# Patient Record
Sex: MALE | NOT HISPANIC OR LATINO
[De-identification: names, ages, dates, MRNs, and addresses within clinical notes are randomized per-mention and may not be internally consistent; named-entity substitution may affect disease eponyms.]

---

## 2018-11-12 PROBLEM — Z87.891 FORMER SMOKER: Status: ACTIVE | Noted: 2018-11-12

## 2018-11-14 ENCOUNTER — NON-APPOINTMENT (OUTPATIENT)
Age: 43
End: 2018-11-14

## 2018-11-14 ENCOUNTER — APPOINTMENT (OUTPATIENT)
Dept: HEART AND VASCULAR | Facility: CLINIC | Age: 43
End: 2018-11-14
Payer: COMMERCIAL

## 2018-11-14 VITALS
BODY MASS INDEX: 24.05 KG/M2 | HEIGHT: 70 IN | DIASTOLIC BLOOD PRESSURE: 70 MMHG | OXYGEN SATURATION: 99 % | HEART RATE: 63 BPM | SYSTOLIC BLOOD PRESSURE: 116 MMHG | WEIGHT: 168 LBS

## 2018-11-14 DIAGNOSIS — Z87.891 PERSONAL HISTORY OF NICOTINE DEPENDENCE: ICD-10-CM

## 2018-11-14 PROCEDURE — 99214 OFFICE O/P EST MOD 30 MIN: CPT | Mod: 25

## 2018-11-14 PROCEDURE — 93000 ELECTROCARDIOGRAM COMPLETE: CPT

## 2018-11-14 NOTE — HISTORY OF PRESENT ILLNESS
[FreeTextEntry1] : Mr. Sandhu presents for evaluation and management of a bicuspid aortic valve, dilated aortic root, and coarctation of the aorta. His bicuspid aortic valve was noted after a physician auscultated a murmur and he went on to have an echocardiogram. He was last seen on 11/03/16. He had an echocardiogram on 10/18/16 which revealed an EF of 62%, bicuspid aortic valve, dilated aortic root (4.1cm), and a coarctation of the aorta with mild gradient (15mmHg).

## 2018-11-14 NOTE — REASON FOR VISIT
[Follow-Up - Clinic] : a clinic follow-up of [FreeTextEntry1] : \par Diagnostic Tests:\par ------------------------------------------\par ECG:\par 11/14/18: NSR, normal ECG. \par 04/21/16: NSR, normal ECG.\par ------------------------------------------\par Echo:\par 10/18/16: EF 62%, bicuspid aortic valve, dilated aortic root (4.1cm), coarctation of the aorta with mild gradient (15mmHg).\par 05/13/13: EF 60%, bicuspid aortic valve, dilated aortic root, coarctation of the aorta.\par 04/26/12: EF 60%, bicuspid aortic valve, dilated aortic root, coarctation of the aorta.\par -------------------------------------------\par MR:\par 02/28/17: MRA head: normal, no cerebral aneurysms. \par \par

## 2018-11-14 NOTE — PHYSICAL EXAM
[General Appearance - Well Developed] : well developed [Normal Appearance] : normal appearance [Well Groomed] : well groomed [General Appearance - Well Nourished] : well nourished [No Deformities] : no deformities [General Appearance - In No Acute Distress] : no acute distress [Normal Conjunctiva] : the conjunctiva exhibited no abnormalities [Eyelids - No Xanthelasma] : the eyelids demonstrated no xanthelasmas [Normal Oral Mucosa] : normal oral mucosa [No Oral Pallor] : no oral pallor [No Oral Cyanosis] : no oral cyanosis [Normal Jugular Venous A Waves Present] : normal jugular venous A waves present [Normal Jugular Venous V Waves Present] : normal jugular venous V waves present [No Jugular Venous Mcarthur A Waves] : no jugular venous mcarthur A waves [Respiration, Rhythm And Depth] : normal respiratory rhythm and effort [Exaggerated Use Of Accessory Muscles For Inspiration] : no accessory muscle use [Auscultation Breath Sounds / Voice Sounds] : lungs were clear to auscultation bilaterally [Heart Rate And Rhythm] : heart rate and rhythm were normal [Heart Sounds] : normal S1 and S2 [Murmurs] : no murmurs present [Abdomen Soft] : soft [Abdomen Tenderness] : non-tender [Abdomen Mass (___ Cm)] : no abdominal mass palpated [Abnormal Walk] : normal gait [Gait - Sufficient For Exercise Testing] : the gait was sufficient for exercise testing [Nail Clubbing] : no clubbing of the fingernails [Cyanosis, Localized] : no localized cyanosis [Petechial Hemorrhages (___cm)] : no petechial hemorrhages [Skin Color & Pigmentation] : normal skin color and pigmentation [] : no rash [No Venous Stasis] : no venous stasis [Skin Lesions] : no skin lesions [No Skin Ulcers] : no skin ulcer [No Xanthoma] : no  xanthoma was observed [Oriented To Time, Place, And Person] : oriented to person, place, and time [Affect] : the affect was normal [Mood] : the mood was normal [No Anxiety] : not feeling anxious [FreeTextEntry1] : + systolic ejection click, no murmur appreciated

## 2018-11-14 NOTE — ASSESSMENT
[FreeTextEntry1] : \par A/P:\par \par 1. Bicuspid aortic valve:\par  - will follow with serial echocardiograms (ordered today)\par \par 2. Coarctation of the aorta:\par  - will follow with serial echocardiograms (ordered today)\par \par 3. Dilated aortic root:\par  - will follow with serial echocardiograms (ordered today)\par \par

## 2018-12-12 ENCOUNTER — APPOINTMENT (OUTPATIENT)
Dept: HEART AND VASCULAR | Facility: CLINIC | Age: 43
End: 2018-12-12
Payer: COMMERCIAL

## 2018-12-12 PROCEDURE — 93306 TTE W/DOPPLER COMPLETE: CPT

## 2019-03-14 ENCOUNTER — APPOINTMENT (OUTPATIENT)
Dept: HEART AND VASCULAR | Facility: CLINIC | Age: 44
End: 2019-03-14

## 2020-09-25 ENCOUNTER — APPOINTMENT (OUTPATIENT)
Dept: HEART AND VASCULAR | Facility: CLINIC | Age: 45
End: 2020-09-25
Payer: COMMERCIAL

## 2020-09-25 PROCEDURE — 93306 TTE W/DOPPLER COMPLETE: CPT

## 2021-03-07 RX ORDER — AMOXICILLIN 500 MG/1
500 CAPSULE ORAL
Qty: 4 | Refills: 3 | Status: ACTIVE | COMMUNITY
Start: 2021-03-07 | End: 1900-01-01

## 2021-03-14 PROBLEM — I77.810 DILATED AORTIC ROOT: Status: ACTIVE | Noted: 2018-11-12

## 2021-03-14 PROBLEM — Q25.1 COARCTATION OF AORTA: Status: ACTIVE | Noted: 2018-11-12

## 2021-03-14 PROBLEM — Q23.1 BICUSPID AORTIC VALVE: Status: ACTIVE | Noted: 2018-11-12

## 2021-03-16 ENCOUNTER — APPOINTMENT (OUTPATIENT)
Dept: HEART AND VASCULAR | Facility: CLINIC | Age: 46
End: 2021-03-16
Payer: COMMERCIAL

## 2021-03-16 ENCOUNTER — NON-APPOINTMENT (OUTPATIENT)
Age: 46
End: 2021-03-16

## 2021-03-16 VITALS
DIASTOLIC BLOOD PRESSURE: 64 MMHG | SYSTOLIC BLOOD PRESSURE: 97 MMHG | WEIGHT: 171 LBS | HEART RATE: 80 BPM | OXYGEN SATURATION: 97 % | HEIGHT: 70 IN | BODY MASS INDEX: 24.48 KG/M2

## 2021-03-16 VITALS — DIASTOLIC BLOOD PRESSURE: 64 MMHG | SYSTOLIC BLOOD PRESSURE: 117 MMHG

## 2021-03-16 DIAGNOSIS — I77.810 THORACIC AORTIC ECTASIA: ICD-10-CM

## 2021-03-16 DIAGNOSIS — Q23.1 CONGENITAL INSUFFICIENCY OF AORTIC VALVE: ICD-10-CM

## 2021-03-16 DIAGNOSIS — Z00.00 ENCOUNTER FOR GENERAL ADULT MEDICAL EXAMINATION W/OUT ABNORMAL FINDINGS: ICD-10-CM

## 2021-03-16 DIAGNOSIS — Q25.1 COARCTATION OF AORTA: ICD-10-CM

## 2021-03-16 PROCEDURE — 93000 ELECTROCARDIOGRAM COMPLETE: CPT

## 2021-03-16 PROCEDURE — 99072 ADDL SUPL MATRL&STAF TM PHE: CPT

## 2021-03-16 PROCEDURE — 99214 OFFICE O/P EST MOD 30 MIN: CPT | Mod: 25

## 2021-03-16 NOTE — ASSESSMENT
[FreeTextEntry1] : 1. Bicuspid aortic valve:\par  - will follow with serial echocardiograms (ordered today)\par \par 2. Coarctation of the aorta:\par  - will follow with serial echocardiograms (ordered today)\par \par 3. Dilated aortic root:\par  - will follow with serial echocardiograms (ordered today)\par \par 4. Left 3rd finger injury:\par  - will send to Chris Choudhary MD for hand orthopedic consult\par \par check lab work today\par \par

## 2021-03-16 NOTE — REASON FOR VISIT
[FreeTextEntry1] : Diagnostic Tests:\par ------------------------------------------\par ECG:\par 03/16/21: NSR, possible old septal MI.  \par 11/14/18: NSR, normal ECG. \par 04/21/16: NSR, normal ECG.\par ------------------------------------------\par Echo:\par 09/25/20: EF 65%, bicuspid aortic valve, dilated aortic root (4.0cm), coarctation of the aorta with mild gradient (9mmHg).\par 10/18/16: EF 62%, bicuspid aortic valve, dilated aortic root (4.1cm), coarctation of the aorta with mild gradient (15mmHg).\par 05/13/13: EF 60%, bicuspid aortic valve, dilated aortic root, coarctation of the aorta.\par 04/26/12: EF 60%, bicuspid aortic valve, dilated aortic root, coarctation of the aorta.\par -------------------------------------------\par MR:\par 02/28/17: MRA head: normal, no cerebral aneurysms. \par \par

## 2021-03-16 NOTE — HISTORY OF PRESENT ILLNESS
[FreeTextEntry1] : Mr. Sandhu presents for evaluation and management of a bicuspid aortic valve, dilated aortic root, and coarctation of the aorta. His bicuspid aortic valve was noted after a physician auscultated a murmur and he went on to have an echocardiogram. He was last seen in the office on 11/14/18. He had an echocardiogram on 09/25/20 which revealed an EF of 65%, bicuspid aortic valve, dilated aortic root (4.0cm), and coarctation of the aorta with mild gradient (9mmHg).   He denies chest pain and has COHEN to several flights of stairs.  He injured his left 3rd finger and would like to see an orthopedic surgeon.

## 2021-03-18 LAB
ALBUMIN SERPL ELPH-MCNC: 5 G/DL
ALP BLD-CCNC: 58 U/L
ALT SERPL-CCNC: 17 U/L
ANION GAP SERPL CALC-SCNC: 14 MMOL/L
AST SERPL-CCNC: 19 U/L
BASOPHILS # BLD AUTO: 0.07 K/UL
BASOPHILS NFR BLD AUTO: 1.4 %
BILIRUB SERPL-MCNC: 0.5 MG/DL
BUN SERPL-MCNC: 16 MG/DL
CALCIUM SERPL-MCNC: 10.2 MG/DL
CHLORIDE SERPL-SCNC: 101 MMOL/L
CHOLEST SERPL-MCNC: 214 MG/DL
CO2 SERPL-SCNC: 25 MMOL/L
CREAT SERPL-MCNC: 1 MG/DL
EOSINOPHIL # BLD AUTO: 0.2 K/UL
EOSINOPHIL NFR BLD AUTO: 4 %
ESTIMATED AVERAGE GLUCOSE: 103 MG/DL
GLUCOSE SERPL-MCNC: 85 MG/DL
HBA1C MFR BLD HPLC: 5.2 %
HCT VFR BLD CALC: 46.9 %
HDLC SERPL-MCNC: 72 MG/DL
HGB BLD-MCNC: 15.4 G/DL
IMM GRANULOCYTES NFR BLD AUTO: 0 %
LDLC SERPL CALC-MCNC: 129 MG/DL
LDLC SERPL DIRECT ASSAY-MCNC: 131 MG/DL
LYMPHOCYTES # BLD AUTO: 1.49 K/UL
LYMPHOCYTES NFR BLD AUTO: 30.2 %
MAN DIFF?: NORMAL
MCHC RBC-ENTMCNC: 30.7 PG
MCHC RBC-ENTMCNC: 32.8 GM/DL
MCV RBC AUTO: 93.4 FL
MONOCYTES # BLD AUTO: 0.39 K/UL
MONOCYTES NFR BLD AUTO: 7.9 %
NEUTROPHILS # BLD AUTO: 2.79 K/UL
NEUTROPHILS NFR BLD AUTO: 56.5 %
NONHDLC SERPL-MCNC: 142 MG/DL
PLATELET # BLD AUTO: 222 K/UL
POTASSIUM SERPL-SCNC: 4.7 MMOL/L
PROT SERPL-MCNC: 7.7 G/DL
RBC # BLD: 5.02 M/UL
RBC # FLD: 13 %
SODIUM SERPL-SCNC: 140 MMOL/L
TRIGL SERPL-MCNC: 64 MG/DL
TSH SERPL-ACNC: 1.97 UIU/ML
WBC # FLD AUTO: 4.94 K/UL

## 2021-04-08 ENCOUNTER — APPOINTMENT (OUTPATIENT)
Dept: HEART AND VASCULAR | Facility: CLINIC | Age: 46
End: 2021-04-08
Payer: COMMERCIAL

## 2021-04-08 ENCOUNTER — NON-APPOINTMENT (OUTPATIENT)
Age: 46
End: 2021-04-08

## 2021-04-08 PROCEDURE — 93306 TTE W/DOPPLER COMPLETE: CPT

## 2021-04-08 PROCEDURE — 99072 ADDL SUPL MATRL&STAF TM PHE: CPT

## 2022-11-28 ENCOUNTER — APPOINTMENT (OUTPATIENT)
Dept: HEART AND VASCULAR | Facility: CLINIC | Age: 47
End: 2022-11-28

## 2022-11-28 PROCEDURE — 93306 TTE W/DOPPLER COMPLETE: CPT

## 2024-04-26 ENCOUNTER — APPOINTMENT (OUTPATIENT)
Dept: HEART AND VASCULAR | Facility: CLINIC | Age: 49
End: 2024-04-26
Payer: COMMERCIAL

## 2024-04-26 PROCEDURE — 93306 TTE W/DOPPLER COMPLETE: CPT

## 2024-04-26 NOTE — HISTORY OF PRESENT ILLNESS
[FreeTextEntry1] : Mr. Sandhu presents for evaluation and management of a bicuspid aortic valve, dilated aortic root, and coarctation of the aorta. His bicuspid aortic valve was noted after a physician auscultated a murmur and he went on to have an echocardiogram. On 04/26/24, he had an echocardiogram which revealed an EF of 64%, bicuspid aortic valve (fusion of right and left coronary cusps), trace AI, dilated aortic root 4.1cm (2.1 cm/m2), dilated proximal ascending aorta 4.2cm (2.2 cm/m2), and coarctation of the aorta 8 mmHg peak gradient.

## 2024-04-26 NOTE — ASSESSMENT
[FreeTextEntry1] : 1. Bicuspid aortic valve:\par  - will follow with serial echocardiograms  \par \par 2. Coarctation of the aorta:\par  - will follow with serial echocardiograms \par \par 3. Dilated aortic root:\par  - will follow with serial echocardiograms \par \par 4. Left 3rd finger injury:\par  - will send to Chris Choudhary MD for hand orthopedic consult\par \par  \par \par

## 2024-04-26 NOTE — REASON FOR VISIT
[Follow-Up - Clinic] : a clinic follow-up of [FreeTextEntry1] : ======================================================================================= Diagnostic Tests: -------------------------------------------------------------- EC21: NSR, possible old septal MI.   18: NSR, normal ECG.  16: NSR, normal ECG. -------------------------------------------------------------- Echo: 24: EF 64%, bicuspid aortic valve (fusion of right and left coronary cusps), trace AI, dilated aortic root 4.1cm (2.1 cm/m2), dilated proximal ascending aorta 4.2cm (2.2 cm/m2), coarctation of the aorta 8 mmHg peak gradient.  22: EF 63%, bicuspid aortic valve (fusion of right and left coronary cusps), trace AI, dilated aortic root 4.2cm (2.2 cm/m2), dilated proximal ascending aorta 4.3cm (2.2 cm/m2), coarctation of the aorta 18.7 mmHg peak gradient.  21: EF 69%, bicuspid aortic valve, trace AI, dilated aortic root 4.0cm, dilated proximal ascending aorta 4.1cm, coarctation of the aorta 12.8 mmHg peak gradient.  20: EF 65%, bicuspid aortic valve, dilated aortic root (4.0cm), coarctation of the aorta with mild gradient (9mmHg). 10/18/16: EF 62%, bicuspid aortic valve, dilated aortic root (4.1cm), coarctation of the aorta with mild gradient (15mmHg). 13: EF 60%, bicuspid aortic valve, dilated aortic root, coarctation of the aorta. 12: EF 60%, bicuspid aortic valve, dilated aortic root, coarctation of the aorta. -------------------------------------------------------------- MRI: 17: MRA head: normal, no cerebral aneurysms.

## 2025-02-24 ENCOUNTER — APPOINTMENT (OUTPATIENT)
Dept: HEART AND VASCULAR | Facility: CLINIC | Age: 50
End: 2025-02-24
Payer: COMMERCIAL

## 2025-02-24 ENCOUNTER — NON-APPOINTMENT (OUTPATIENT)
Age: 50
End: 2025-02-24

## 2025-02-24 VITALS
TEMPERATURE: 97 F | BODY MASS INDEX: 24.54 KG/M2 | OXYGEN SATURATION: 98 % | DIASTOLIC BLOOD PRESSURE: 58 MMHG | SYSTOLIC BLOOD PRESSURE: 103 MMHG | HEART RATE: 61 BPM | WEIGHT: 171.38 LBS | HEIGHT: 70 IN

## 2025-02-24 DIAGNOSIS — Q23.81 BICUSPID AORTIC VALVE: ICD-10-CM

## 2025-02-24 DIAGNOSIS — Q25.1 COARCTATION OF AORTA: ICD-10-CM

## 2025-02-24 DIAGNOSIS — I77.810 THORACIC AORTIC ECTASIA: ICD-10-CM

## 2025-02-24 PROCEDURE — 93306 TTE W/DOPPLER COMPLETE: CPT | Mod: 59

## 2025-02-24 PROCEDURE — G2211 COMPLEX E/M VISIT ADD ON: CPT | Mod: NC

## 2025-02-24 PROCEDURE — 99204 OFFICE O/P NEW MOD 45 MIN: CPT

## 2025-02-24 PROCEDURE — 93000 ELECTROCARDIOGRAM COMPLETE: CPT

## 2025-02-25 LAB
ALBUMIN SERPL ELPH-MCNC: 4.7 G/DL
ALP BLD-CCNC: 60 U/L
ALT SERPL-CCNC: 21 U/L
ANION GAP SERPL CALC-SCNC: 14 MMOL/L
AST SERPL-CCNC: 21 U/L
BASOPHILS # BLD AUTO: 0.08 K/UL
BASOPHILS NFR BLD AUTO: 1.4 %
BILIRUB SERPL-MCNC: 0.6 MG/DL
BUN SERPL-MCNC: 14 MG/DL
CALCIUM SERPL-MCNC: 9.9 MG/DL
CHLORIDE SERPL-SCNC: 102 MMOL/L
CHOLEST SERPL-MCNC: 208 MG/DL
CO2 SERPL-SCNC: 26 MMOL/L
CREAT SERPL-MCNC: 1.05 MG/DL
EGFR: 86 ML/MIN/1.73M2
EOSINOPHIL # BLD AUTO: 0.16 K/UL
EOSINOPHIL NFR BLD AUTO: 2.8 %
ESTIMATED AVERAGE GLUCOSE: 108 MG/DL
GLUCOSE SERPL-MCNC: 72 MG/DL
HBA1C MFR BLD HPLC: 5.4 %
HCT VFR BLD CALC: 45.5 %
HDLC SERPL-MCNC: 72 MG/DL
HGB BLD-MCNC: 14.9 G/DL
IMM GRANULOCYTES NFR BLD AUTO: 0.2 %
LDLC SERPL DIRECT ASSAY-MCNC: 125 MG/DL
LYMPHOCYTES # BLD AUTO: 1.7 K/UL
LYMPHOCYTES NFR BLD AUTO: 29.9 %
MAN DIFF?: NORMAL
MCHC RBC-ENTMCNC: 30 PG
MCHC RBC-ENTMCNC: 32.7 G/DL
MCV RBC AUTO: 91.5 FL
MONOCYTES # BLD AUTO: 0.39 K/UL
MONOCYTES NFR BLD AUTO: 6.9 %
NEUTROPHILS # BLD AUTO: 3.35 K/UL
NEUTROPHILS NFR BLD AUTO: 58.8 %
PLATELET # BLD AUTO: 219 K/UL
POTASSIUM SERPL-SCNC: 4.7 MMOL/L
PROT SERPL-MCNC: 7.5 G/DL
RBC # BLD: 4.97 M/UL
RBC # FLD: 13.2 %
SODIUM SERPL-SCNC: 141 MMOL/L
TRIGL SERPL-MCNC: 60 MG/DL
TSH SERPL-ACNC: 2.46 UIU/ML
WBC # FLD AUTO: 5.69 K/UL

## 2025-02-26 LAB — 24R-OH-CALCIDIOL SERPL-MCNC: 34.3 PG/ML
